# Patient Record
Sex: FEMALE | Race: WHITE | Employment: OTHER
[De-identification: names, ages, dates, MRNs, and addresses within clinical notes are randomized per-mention and may not be internally consistent; named-entity substitution may affect disease eponyms.]

---

## 2020-01-07 ENCOUNTER — NURSE TRIAGE (OUTPATIENT)
Dept: OTHER | Facility: CLINIC | Age: 85
End: 2020-01-07

## 2020-01-07 NOTE — TELEPHONE ENCOUNTER
Reason for Disposition   Caller has already spoken with another triager and has no further questions    Protocols used: NO CONTACT OR DUPLICATE CONTACT CALL-ADULT-[place]    Patient called in to report having cough and was diagnosed with influenza recently. States was seen by a provider for her symptoms and was given prescription medications. Reports symptoms have improved but are still present. Patient reports prescription medications have not relieved the cough and is wanting different prescription. Dayanara Wilde directed patient to call back to provider to inform of this information. Caller reports symptoms as documented above. Caller informed of disposition. Care advice as documented. Verbalized understanding and denies any further questions/concerns. Please do not respond to the triage nurse through this encounter. Any subsequent communication should be directly with the patient.